# Patient Record
Sex: MALE | Race: WHITE | NOT HISPANIC OR LATINO | Employment: FULL TIME | URBAN - METROPOLITAN AREA
[De-identification: names, ages, dates, MRNs, and addresses within clinical notes are randomized per-mention and may not be internally consistent; named-entity substitution may affect disease eponyms.]

---

## 2024-03-08 DIAGNOSIS — F41.9 ANXIETY AND DEPRESSION: Primary | ICD-10-CM

## 2024-03-08 DIAGNOSIS — F32.A ANXIETY AND DEPRESSION: Primary | ICD-10-CM

## 2024-03-14 RX ORDER — ESCITALOPRAM OXALATE 10 MG/1
10 TABLET ORAL DAILY
Qty: 30 TABLET | Refills: 0 | Status: SHIPPED | OUTPATIENT
Start: 2024-03-14 | End: 2024-04-10

## 2024-03-14 RX ORDER — ESCITALOPRAM OXALATE 10 MG/1
10 TABLET ORAL DAILY
Qty: 90 TABLET | Refills: 3 | OUTPATIENT
Start: 2024-03-14

## 2024-03-14 NOTE — TELEPHONE ENCOUNTER
----- Message from NANCY Riojas sent at 3/14/2024  5:00 AM EDT -----  Regarding: Follow up  Pt needs to be seen I can do a bridge if he is out.

## 2024-03-14 NOTE — TELEPHONE ENCOUNTER
----- Message from NANCY Riojas sent at 3/13/2024  5:44 AM EDT -----  Regarding: OV  Pt due for follow up in office.  Is he out of Rx?

## 2024-03-14 NOTE — TELEPHONE ENCOUNTER
----- Message from NANCY Riojas sent at 3/14/2024  5:11 PM EDT -----  Regarding: FW: Follow up  Is he out?    ----- Message -----  From: Mary Kate Jose MA  Sent: 3/14/2024   4:43 PM EDT  To: NANCY Riojas  Subject: FW: Follow up                                      ----- Message -----  From: NANCY Riojas  Sent: 3/14/2024   5:00 AM EDT  To: Mary Kate Jose MA  Subject: Follow up                                        Pt needs to be seen I can do a bridge if he is out.

## 2024-03-14 NOTE — TELEPHONE ENCOUNTER
Spoke to pt, apt made w/ you for 4/24/2024. He will need a refill of his medication to get him till his apt.

## 2024-04-06 ENCOUNTER — TELEPHONE (OUTPATIENT)
Dept: PRIMARY CARE | Facility: CLINIC | Age: 29
End: 2024-04-06
Payer: COMMERCIAL

## 2024-04-06 NOTE — TELEPHONE ENCOUNTER
----- Message from Mary Kate Jose MA sent at 3/14/2024  5:23 PM EDT -----  Regarding: FW: Follow up    ----- Message -----  From: NANCY Riojas  Sent: 3/14/2024   5:12 PM EDT  To: Mary Kate Jose MA  Subject: FW: Follow up                                    Is he out?    ----- Message -----  From: Mary Kate Jose MA  Sent: 3/14/2024   4:43 PM EDT  To: NANCY Riojas  Subject: FW: Follow up                                      ----- Message -----  From: NANCY Riojas  Sent: 3/14/2024   5:00 AM EDT  To: Mary Kate Jose MA  Subject: Follow up                                        Pt needs to be seen I can do a bridge if he is out.

## 2024-04-08 DIAGNOSIS — F41.9 ANXIETY AND DEPRESSION: ICD-10-CM

## 2024-04-08 DIAGNOSIS — F32.A ANXIETY AND DEPRESSION: ICD-10-CM

## 2024-04-10 RX ORDER — ESCITALOPRAM OXALATE 10 MG/1
10 TABLET ORAL DAILY
Qty: 90 TABLET | Refills: 0 | Status: SHIPPED | OUTPATIENT
Start: 2024-04-10 | End: 2024-04-24 | Stop reason: SDUPTHER

## 2024-04-24 ENCOUNTER — OFFICE VISIT (OUTPATIENT)
Dept: PRIMARY CARE | Facility: CLINIC | Age: 29
End: 2024-04-24
Payer: COMMERCIAL

## 2024-04-24 VITALS
HEIGHT: 73 IN | TEMPERATURE: 98.1 F | BODY MASS INDEX: 37.91 KG/M2 | SYSTOLIC BLOOD PRESSURE: 152 MMHG | WEIGHT: 286 LBS | DIASTOLIC BLOOD PRESSURE: 82 MMHG

## 2024-04-24 DIAGNOSIS — R03.0 ELEVATED BLOOD PRESSURE READING: Primary | ICD-10-CM

## 2024-04-24 DIAGNOSIS — E66.9 OBESITY WITH BODY MASS INDEX 30 OR GREATER: ICD-10-CM

## 2024-04-24 DIAGNOSIS — F32.A ANXIETY AND DEPRESSION: ICD-10-CM

## 2024-04-24 DIAGNOSIS — F41.9 ANXIETY AND DEPRESSION: ICD-10-CM

## 2024-04-24 PROBLEM — R79.89 ABNORMAL LFTS: Status: ACTIVE | Noted: 2024-04-24

## 2024-04-24 PROBLEM — E55.9 VITAMIN D DEFICIENCY: Status: ACTIVE | Noted: 2024-04-24

## 2024-04-24 PROBLEM — D64.9 ANEMIA: Status: ACTIVE | Noted: 2024-04-24

## 2024-04-24 PROBLEM — I10 HYPERTENSION, ESSENTIAL: Status: ACTIVE | Noted: 2024-04-24

## 2024-04-24 PROBLEM — R79.89 ABNORMAL CBC: Status: ACTIVE | Noted: 2024-04-24

## 2024-04-24 PROCEDURE — 1036F TOBACCO NON-USER: CPT | Performed by: NURSE PRACTITIONER

## 2024-04-24 PROCEDURE — 3077F SYST BP >= 140 MM HG: CPT | Performed by: NURSE PRACTITIONER

## 2024-04-24 PROCEDURE — 3079F DIAST BP 80-89 MM HG: CPT | Performed by: NURSE PRACTITIONER

## 2024-04-24 PROCEDURE — 99214 OFFICE O/P EST MOD 30 MIN: CPT | Performed by: NURSE PRACTITIONER

## 2024-04-24 RX ORDER — ESCITALOPRAM OXALATE 10 MG/1
10 TABLET ORAL DAILY
Qty: 90 TABLET | Refills: 3 | Status: SHIPPED | OUTPATIENT
Start: 2024-04-24

## 2024-04-24 RX ORDER — BISMUTH SUBSALICYLATE 262 MG
1 TABLET,CHEWABLE ORAL DAILY
COMMUNITY

## 2024-04-24 ASSESSMENT — PATIENT HEALTH QUESTIONNAIRE - PHQ9
SUM OF ALL RESPONSES TO PHQ9 QUESTIONS 1 AND 2: 0
1. LITTLE INTEREST OR PLEASURE IN DOING THINGS: NOT AT ALL
2. FEELING DOWN, DEPRESSED OR HOPELESS: NOT AT ALL

## 2024-04-24 ASSESSMENT — ENCOUNTER SYMPTOMS
ACTIVITY CHANGE: 1
UNEXPECTED WEIGHT CHANGE: 1

## 2024-04-24 NOTE — PATIENT INSTRUCTIONS
Good to see you today.  Refill of [facility]  Please recheck your BP  GOAL  < 135/85 all the time.  Add some aerobic activity regularly- aim for 30-40 minutes most days.  Follow up one year and as needed.

## 2024-04-24 NOTE — PROGRESS NOTES
"Subjective   Patient ID: Kyree Painter is a 28 y.o. male who presents for Follow-up (Med refill).    HPI   Here for follow up on medication.  Goes to work - sedentary  Has not been doing much activity.  Not a lot of stress like before.  Mostly physical issues  Helped friend move and did not stretch much.   Lexapro Has helped with mood denies any side effects.  Sleep able to fall asleep and stay asleep.    Review of Systems   Constitutional:  Positive for activity change and unexpected weight change.   All other systems reviewed and are negative.      Objective   /82   Temp 36.7 °C (98.1 °F)   Ht 1.842 m (6' 0.5\")   Wt 130 kg (286 lb)   BMI 38.26 kg/m²     Physical Exam  Vitals and nursing note reviewed.   Constitutional:       Appearance: He is obese.   HENT:      Head: Normocephalic.      Right Ear: Tympanic membrane, ear canal and external ear normal.      Left Ear: Tympanic membrane, ear canal and external ear normal.   Neck:      Thyroid: No thyroid mass, thyromegaly or thyroid tenderness.   Cardiovascular:      Rate and Rhythm: Normal rate and regular rhythm.      Pulses: Normal pulses.      Heart sounds: Normal heart sounds.   Pulmonary:      Effort: Pulmonary effort is normal.      Breath sounds: Normal breath sounds.   Abdominal:      General: Bowel sounds are normal.      Palpations: Abdomen is soft.   Neurological:      Mental Status: He is alert and oriented to person, place, and time.   Psychiatric:         Behavior: Behavior normal.         Thought Content: Thought content normal.         Judgment: Judgment normal.      Comments: Good eye contact, pleasant         Assessment/Plan   Diagnoses and all orders for this visit:  Elevated blood pressure reading  Anxiety and depression  -     escitalopram (Lexapro) 10 mg tablet; Take 1 tablet (10 mg) by mouth once daily.  Obesity with body mass index 30 or greater     Check BP and follow up if elevated.       "

## 2025-04-15 ENCOUNTER — APPOINTMENT (OUTPATIENT)
Dept: PRIMARY CARE | Facility: CLINIC | Age: 30
End: 2025-04-15
Payer: COMMERCIAL

## 2025-04-15 VITALS
BODY MASS INDEX: 36.31 KG/M2 | DIASTOLIC BLOOD PRESSURE: 90 MMHG | WEIGHT: 274 LBS | SYSTOLIC BLOOD PRESSURE: 130 MMHG | HEIGHT: 73 IN | TEMPERATURE: 98.2 F

## 2025-04-15 DIAGNOSIS — Z13.1 SCREENING FOR DIABETES MELLITUS: ICD-10-CM

## 2025-04-15 DIAGNOSIS — Z00.00 ROUTINE GENERAL MEDICAL EXAMINATION AT A HEALTH CARE FACILITY: Primary | ICD-10-CM

## 2025-04-15 DIAGNOSIS — Z13.29 SCREENING FOR THYROID DISORDER: ICD-10-CM

## 2025-04-15 DIAGNOSIS — Z13.6 SCREENING FOR HEART DISEASE: ICD-10-CM

## 2025-04-15 DIAGNOSIS — Z13.228 SCREENING FOR METABOLIC DISORDER: ICD-10-CM

## 2025-04-15 PROBLEM — R79.89 ABNORMAL LFTS: Status: RESOLVED | Noted: 2024-04-24 | Resolved: 2025-04-15

## 2025-04-15 PROBLEM — F41.9 ANXIETY: Status: ACTIVE | Noted: 2025-04-15

## 2025-04-15 LAB
ALBUMIN SERPL-MCNC: 4.8 G/DL (ref 3.6–5.1)
ALP SERPL-CCNC: 73 U/L (ref 36–130)
ALT SERPL-CCNC: 42 U/L (ref 9–46)
ANION GAP SERPL CALCULATED.4IONS-SCNC: 11 MMOL/L (CALC) (ref 7–17)
AST SERPL-CCNC: 22 U/L (ref 10–40)
BILIRUB SERPL-MCNC: 0.5 MG/DL (ref 0.2–1.2)
BUN SERPL-MCNC: 17 MG/DL (ref 7–25)
CALCIUM SERPL-MCNC: 10.1 MG/DL (ref 8.6–10.3)
CHLORIDE SERPL-SCNC: 101 MMOL/L (ref 98–110)
CHOLEST SERPL-MCNC: 200 MG/DL
CHOLEST/HDLC SERPL: 3.6 (CALC)
CO2 SERPL-SCNC: 28 MMOL/L (ref 20–32)
CREAT SERPL-MCNC: 0.83 MG/DL (ref 0.6–1.24)
EGFRCR SERPLBLD CKD-EPI 2021: 122 ML/MIN/1.73M2
ERYTHROCYTE [DISTWIDTH] IN BLOOD BY AUTOMATED COUNT: 12.8 % (ref 11–15)
EST. AVERAGE GLUCOSE BLD GHB EST-MCNC: 105 MG/DL
EST. AVERAGE GLUCOSE BLD GHB EST-SCNC: 5.8 MMOL/L
GLUCOSE SERPL-MCNC: 89 MG/DL (ref 65–99)
HBA1C MFR BLD: 5.3 %
HCT VFR BLD AUTO: 46.7 % (ref 38.5–50)
HDLC SERPL-MCNC: 56 MG/DL
HGB BLD-MCNC: 15.6 G/DL (ref 13.2–17.1)
LDLC SERPL CALC-MCNC: 111 MG/DL (CALC)
MCH RBC QN AUTO: 29.2 PG (ref 27–33)
MCHC RBC AUTO-ENTMCNC: 33.4 G/DL (ref 32–36)
MCV RBC AUTO: 87.3 FL (ref 80–100)
NONHDLC SERPL-MCNC: 144 MG/DL (CALC)
PLATELET # BLD AUTO: 288 THOUSAND/UL (ref 140–400)
PMV BLD REES-ECKER: 9.6 FL (ref 7.5–12.5)
POTASSIUM SERPL-SCNC: 4.4 MMOL/L (ref 3.5–5.3)
PROT SERPL-MCNC: 7.1 G/DL (ref 6.1–8.1)
RBC # BLD AUTO: 5.35 MILLION/UL (ref 4.2–5.8)
SODIUM SERPL-SCNC: 140 MMOL/L (ref 135–146)
TRIGL SERPL-MCNC: 211 MG/DL
TSH SERPL-ACNC: 1.76 MIU/L (ref 0.4–4.5)
WBC # BLD AUTO: 7.6 THOUSAND/UL (ref 3.8–10.8)

## 2025-04-15 PROCEDURE — 1036F TOBACCO NON-USER: CPT | Performed by: NURSE PRACTITIONER

## 2025-04-15 PROCEDURE — 3008F BODY MASS INDEX DOCD: CPT | Performed by: NURSE PRACTITIONER

## 2025-04-15 PROCEDURE — 3075F SYST BP GE 130 - 139MM HG: CPT | Performed by: NURSE PRACTITIONER

## 2025-04-15 PROCEDURE — 99395 PREV VISIT EST AGE 18-39: CPT | Performed by: NURSE PRACTITIONER

## 2025-04-15 PROCEDURE — 3080F DIAST BP >= 90 MM HG: CPT | Performed by: NURSE PRACTITIONER

## 2025-04-15 RX ORDER — ALBUTEROL SULFATE 90 UG/1
4 INHALANT RESPIRATORY (INHALATION)
COMMUNITY
Start: 2025-01-26 | End: 2025-04-15 | Stop reason: WASHOUT

## 2025-04-15 RX ORDER — ACETAMINOPHEN 500 MG
125 TABLET ORAL DAILY
COMMUNITY

## 2025-04-15 RX ORDER — ESCITALOPRAM OXALATE 20 MG/1
1 TABLET ORAL
COMMUNITY
Start: 2025-03-18

## 2025-04-15 RX ORDER — BROMPHENIRAMINE MALEATE, PSEUDOEPHEDRINE HYDROCHLORIDE, AND DEXTROMETHORPHAN HYDROBROMIDE 2; 30; 10 MG/5ML; MG/5ML; MG/5ML
5 SYRUP ORAL
COMMUNITY
Start: 2025-01-26 | End: 2025-04-15 | Stop reason: WASHOUT

## 2025-04-15 ASSESSMENT — ENCOUNTER SYMPTOMS
SLEEP DISTURBANCE: 1
DIAPHORESIS: 1
UNEXPECTED WEIGHT CHANGE: 1
NERVOUS/ANXIOUS: 1
ACTIVITY CHANGE: 1

## 2025-04-15 ASSESSMENT — COLUMBIA-SUICIDE SEVERITY RATING SCALE - C-SSRS
6. HAVE YOU EVER DONE ANYTHING, STARTED TO DO ANYTHING, OR PREPARED TO DO ANYTHING TO END YOUR LIFE?: NO
1. IN THE PAST MONTH, HAVE YOU WISHED YOU WERE DEAD OR WISHED YOU COULD GO TO SLEEP AND NOT WAKE UP?: NO
2. HAVE YOU ACTUALLY HAD ANY THOUGHTS OF KILLING YOURSELF?: NO

## 2025-04-15 ASSESSMENT — PATIENT HEALTH QUESTIONNAIRE - PHQ9
2. FEELING DOWN, DEPRESSED OR HOPELESS: NOT AT ALL
SUM OF ALL RESPONSES TO PHQ9 QUESTIONS 1 AND 2: 0
1. LITTLE INTEREST OR PLEASURE IN DOING THINGS: NOT AT ALL

## 2025-04-15 NOTE — PROGRESS NOTES
Subjective   Patient ID: Kyree Painter is a 29 y.o. male who presents for Follow-up (Yearly check up,med refills).  History of Present Illness  Kyree Painter is a 29-year-old male who presents for a follow-up visit regarding his mental health management and medication adjustment.    Started with some Mental Health counseling, Azeem Rosado at [facility] (wife was going there)  Counselor sent him on for more treatment.  He has been under the care of a psychiatrist, Dr. Martinez, for about a month after being referred by his therapist. He started therapy in late October to address emotional issues. His psychiatrist increased his Lexapro dosage from 10 mg to 20 mg, which has improved his focus and productivity at work. He feels more capable of prioritizing tasks and has regained interest in leisure activities.    Earlier this year, he experienced a severe case of the flu, which was more intense than a mild case of COVID-19 he also had. He has received COVID-19 vaccinations and denies any recent pneumonia or other significant illnesses.    He is attempting to increase his physical activity. He had previously lost weight but has regained some due to mental health challenges. He is considering joining a gym with a coworker for motivation and tries to stay active at work by walking around the building.    His wife has noted that he snores, but he denies any observed breathing interruptions during sleep. He is trying to maintain hydration by drinking at least 64 ounces of water daily. His weight has been relatively stable, fluctuating between 260 and 274 pounds.    No significant heartburn or acid reflux, noting that symptoms depend on dietary intake.    Review of Systems   Constitutional:  Positive for activity change, diaphoresis and unexpected weight change.   Psychiatric/Behavioral:  Positive for sleep disturbance. The patient is nervous/anxious.        [unfilled]    Results         Objective     BP  "130/90   Temp 36.8 °C (98.2 °F)   Ht 1.848 m (6' 0.75\")   Wt 124 kg (274 lb)   BMI 36.40 kg/m²      Physical Exam  VITALS: BP- ?/90  MEASUREMENTS: Weight- 274 #  GENERAL: Alert, cooperative, well developed, no acute distress  HEENT: Normocephalic, normal oropharynx, moist mucous membranes  CHEST: Clear to auscultation bilaterally, no wheezes, rhonchi, or crackles  CARDIOVASCULAR: Normal heart rate and rhythm, S1 and S2 normal without murmurs  ABDOMEN: Soft, non-tender, non-distended, without organomegaly, normal bowel sounds  EXTREMITIES: No cyanosis or edema  NEUROLOGICAL: Cranial nerves grossly intact, moves all extremities without gross motor or sensory deficit  SKIN: Sensitivity to metal noted    Assessment & Plan  Generalized Anxiety Disorder  He experiences issues with focus and emotional regulation, potentially related to anxiety, depression, or attention issues. The psychiatrist increased Lexapro to 20 mg, improving focus and emotional well-being, with better task prioritization and engagement in leisure activities. Accurate diagnosis is crucial for appropriate treatment.  - Continue Lexapro 20 mg as prescribed by psychiatrist  - Follow up with psychiatrist and therapist monthly    Obstructive Sleep Apnea (MAC)  Reports snoring without observed apneas or gasping. Discussed potential MAC and associated risks, including increased stroke and heart attack incidence. Recommended spouse observation for sleep-disordered breathing signs and possible home sleep study if signs are observed.  - Discuss with spouse about any observed apneas or gasping during sleep  - Consider home sleep study if spouse reports signs of sleep-disordered breathing    Exercise and Weight Management  He is increasing physical activity and managing weight. Discussed 'exercise snacking' benefits for health improvement and encouraged short activity bouts. Mentioned potential benefits of gym membership with a coworker for motivation and " accountability.  - Encourage 'exercise snacking' with short bouts of activity  - Consider joining a gym with a coworker for motivation    General Health Maintenance  He has not had comprehensive labs since 2021, with partial labs in 2022. Monitoring metabolic health is important, especially when addressing mental health, to rule out underlying metabolic conditions affecting treatment outcomes. Discussed importance of checking cholesterol and blood sugar levels.  - Order fasting blood work including cholesterol and blood sugar  - Perform blood draw today as he is fasting    Recording duration: 19 minutes       Diagnoses and all orders for this visit:  Routine general medical examination at a health care facility  Screening for heart disease  -     Comprehensive Metabolic Panel; Future  -     Lipid Panel; Future  Screening for thyroid disorder  -     TSH with reflex to Free T4 if abnormal; Future  Screening for diabetes mellitus  -     Hemoglobin A1C; Future  Screening for metabolic disorder  -     CBC; Future       APRIL Riojas-CNP     This medical note was created with the assistance of artificial intelligence (AI) for documentation purposes. The content has been reviewed and confirmed by the healthcare provider for accuracy and completeness. Patient consented to the use of audio recording and use of AI during their visit.

## 2025-04-15 NOTE — PATIENT INSTRUCTIONS
VISIT SUMMARY:  You came in today for a follow-up visit to discuss your mental health management and medication adjustment. We reviewed your progress with your current treatment plan, including your medication, physical activity, and overall health maintenance.    YOUR PLAN:  -GENERALIZED ANXIETY DISORDER: Generalized Anxiety Disorder is a condition characterized by persistent and excessive worry about various aspects of life. Your psychiatrist increased your Lexapro dosage to 20 mg, which has helped improve your focus and emotional well-being. Please continue taking Lexapro 20 mg as prescribed and follow up with your psychiatrist and therapist monthly.    -OBSTRUCTIVE SLEEP APNEA (MAC): Obstructive Sleep Apnea is a condition where breathing repeatedly stops and starts during sleep. You reported snoring without observed apneas or gasping. Please discuss with your spouse about any observed apneas or gasping during your sleep. If your spouse notices any signs of sleep-disordered breathing, consider a home sleep study.    -EXERCISE AND WEIGHT MANAGEMENT: Increasing physical activity and managing weight are important for overall health. We discussed the benefits of 'exercise snacking,' which involves short bouts of activity throughout the day. Consider joining a gym with a coworker for added motivation and accountability.    -GENERAL HEALTH MAINTENANCE: Regular health check-ups and lab tests are important to monitor your overall health. Since you have not had comprehensive labs since 2021, we will perform a fasting blood draw today to check your cholesterol and blood sugar levels.    INSTRUCTIONS:  Please follow up with your psychiatrist and therapist monthly for your mental health management. Discuss with your spouse about any observed apneas or gasping during your sleep and consider a home sleep study if needed. Continue with 'exercise snacking' and consider joining a gym with a coworker. We will perform a fasting  blood draw today to check your cholesterol and blood sugar levels.

## 2025-08-04 ENCOUNTER — APPOINTMENT (OUTPATIENT)
Dept: UROLOGY | Facility: CLINIC | Age: 30
End: 2025-08-04
Payer: COMMERCIAL

## 2025-08-25 ENCOUNTER — APPOINTMENT (OUTPATIENT)
Dept: RADIOLOGY | Facility: HOSPITAL | Age: 30
End: 2025-08-25
Payer: COMMERCIAL

## 2025-08-25 ENCOUNTER — HOSPITAL ENCOUNTER (EMERGENCY)
Facility: HOSPITAL | Age: 30
Discharge: HOME | End: 2025-08-25
Payer: MEDICARE

## 2025-08-25 PROCEDURE — 73590 X-RAY EXAM OF LOWER LEG: CPT | Mod: RT

## 2025-08-25 ASSESSMENT — LIFESTYLE VARIABLES
TOTAL SCORE: 0
HAVE PEOPLE ANNOYED YOU BY CRITICIZING YOUR DRINKING: NO
EVER FELT BAD OR GUILTY ABOUT YOUR DRINKING: NO
HAVE YOU EVER FELT YOU SHOULD CUT DOWN ON YOUR DRINKING: NO
EVER HAD A DRINK FIRST THING IN THE MORNING TO STEADY YOUR NERVES TO GET RID OF A HANGOVER: NO

## 2025-08-25 ASSESSMENT — PAIN - FUNCTIONAL ASSESSMENT: PAIN_FUNCTIONAL_ASSESSMENT: 0-10

## 2025-08-25 ASSESSMENT — PAIN SCALES - GENERAL: PAINLEVEL_OUTOF10: 3

## 2026-04-15 ENCOUNTER — APPOINTMENT (OUTPATIENT)
Dept: PRIMARY CARE | Facility: CLINIC | Age: 31
End: 2026-04-15
Payer: COMMERCIAL